# Patient Record
Sex: MALE | Race: WHITE
[De-identification: names, ages, dates, MRNs, and addresses within clinical notes are randomized per-mention and may not be internally consistent; named-entity substitution may affect disease eponyms.]

---

## 2018-08-08 ENCOUNTER — HOSPITAL ENCOUNTER (OUTPATIENT)
Dept: HOSPITAL 6 - RAD | Age: 33
End: 2018-08-08
Attending: NURSE PRACTITIONER
Payer: COMMERCIAL

## 2018-08-08 DIAGNOSIS — M79.671: Primary | ICD-10-CM

## 2019-04-01 ENCOUNTER — HOSPITAL ENCOUNTER (OUTPATIENT)
Dept: HOSPITAL 19 - SDCO | Age: 34
Discharge: HOME | End: 2019-04-01
Attending: OTOLARYNGOLOGY
Payer: COMMERCIAL

## 2019-04-01 VITALS — WEIGHT: 173.28 LBS | HEIGHT: 74 IN | BODY MASS INDEX: 22.24 KG/M2

## 2019-04-01 VITALS — HEART RATE: 54 BPM | DIASTOLIC BLOOD PRESSURE: 67 MMHG | SYSTOLIC BLOOD PRESSURE: 123 MMHG

## 2019-04-01 VITALS — DIASTOLIC BLOOD PRESSURE: 75 MMHG | HEART RATE: 50 BPM | TEMPERATURE: 98.6 F | SYSTOLIC BLOOD PRESSURE: 111 MMHG

## 2019-04-01 VITALS — SYSTOLIC BLOOD PRESSURE: 119 MMHG | HEART RATE: 40 BPM | TEMPERATURE: 98 F | DIASTOLIC BLOOD PRESSURE: 75 MMHG

## 2019-04-01 VITALS — TEMPERATURE: 98.4 F | HEART RATE: 50 BPM | DIASTOLIC BLOOD PRESSURE: 75 MMHG | SYSTOLIC BLOOD PRESSURE: 111 MMHG

## 2019-04-01 DIAGNOSIS — Z79.899: ICD-10-CM

## 2019-04-01 DIAGNOSIS — J35.01: Primary | ICD-10-CM

## 2019-04-01 DIAGNOSIS — L40.9: ICD-10-CM

## 2019-04-01 NOTE — NUR
Patient tolerates juice without any nausea. Denies wanting anything to eat at
this time. Denies pain or nausea. Reports he is ready to go home.

## 2019-04-01 NOTE — NUR
Patient arrives back from PACU alert, denies pain or nausea. Patient monitor
applied, vitals stable. Patient's spouse brought to bedside.

## 2019-04-01 NOTE — NUR
Dismissal instructions gone over with patient and spouse. Both verbalize
understanding and all questions answered.

## 2019-04-08 ENCOUNTER — HOSPITAL ENCOUNTER (EMERGENCY)
Dept: HOSPITAL 19 - COL.ER | Age: 34
Discharge: HOME | End: 2019-04-08
Payer: COMMERCIAL

## 2019-04-08 VITALS — WEIGHT: 169.32 LBS | BODY MASS INDEX: 21.73 KG/M2 | HEIGHT: 74.02 IN

## 2019-04-08 VITALS — WEIGHT: 169.32 LBS | HEIGHT: 74.02 IN | BODY MASS INDEX: 21.73 KG/M2

## 2019-04-08 VITALS — TEMPERATURE: 98.2 F

## 2019-04-08 VITALS — TEMPERATURE: 97.4 F

## 2019-04-08 VITALS — HEART RATE: 49 BPM | SYSTOLIC BLOOD PRESSURE: 117 MMHG | DIASTOLIC BLOOD PRESSURE: 77 MMHG

## 2019-04-08 VITALS — DIASTOLIC BLOOD PRESSURE: 78 MMHG | SYSTOLIC BLOOD PRESSURE: 124 MMHG | HEART RATE: 60 BPM

## 2019-04-08 DIAGNOSIS — Z90.49: ICD-10-CM

## 2019-04-08 DIAGNOSIS — J95.830: Primary | ICD-10-CM

## 2019-04-08 LAB
BASOPHILS # BLD: 0.1 10*3/UL (ref 0–0.2)
BASOPHILS NFR BLD AUTO: 0.4 % (ref 0–2)
EOSINOPHIL # BLD: 0.1 10*3/UL (ref 0–0.7)
EOSINOPHIL NFR BLD: 1.1 % (ref 0–4)
ERYTHROCYTE [DISTWIDTH] IN BLOOD BY AUTOMATED COUNT: 12.5 % (ref 11.5–14.5)
GRANULOCYTES # BLD AUTO: 67.9 % (ref 42.2–75.2)
HCT VFR BLD AUTO: 46.6 % (ref 42–52)
HGB BLD-MCNC: 15.6 G/DL (ref 13.5–18)
LYMPHOCYTES # BLD: 2.6 10*3/UL (ref 1.2–3.4)
LYMPHOCYTES NFR BLD: 22.1 % (ref 20–51)
MCH RBC QN AUTO: 29 PG (ref 27–31)
MCHC RBC AUTO-ENTMCNC: 34 G/DL (ref 33–37)
MCV RBC AUTO: 87 FL (ref 80–100)
MONOCYTES # BLD: 1 10*3/UL (ref 0.1–0.6)
MONOCYTES NFR BLD AUTO: 8.2 % (ref 1.7–9.3)
NEUTROPHILS # BLD: 8.1 10*3/UL (ref 1.4–6.5)
PLATELET # BLD AUTO: 230 K/MM3 (ref 130–400)
PMV BLD AUTO: 10.7 FL (ref 7.4–10.4)
RBC # BLD AUTO: 5.37 M/MM3 (ref 4.2–5.6)